# Patient Record
Sex: MALE | Race: WHITE
[De-identification: names, ages, dates, MRNs, and addresses within clinical notes are randomized per-mention and may not be internally consistent; named-entity substitution may affect disease eponyms.]

---

## 2019-03-09 ENCOUNTER — HOSPITAL ENCOUNTER (INPATIENT)
Dept: HOSPITAL 54 - ER | Age: 59
LOS: 4 days | Discharge: HOME HEALTH | DRG: 69 | End: 2019-03-13
Attending: FAMILY MEDICINE | Admitting: NURSE PRACTITIONER
Payer: COMMERCIAL

## 2019-03-09 VITALS — DIASTOLIC BLOOD PRESSURE: 71 MMHG | SYSTOLIC BLOOD PRESSURE: 118 MMHG

## 2019-03-09 VITALS — BODY MASS INDEX: 29.16 KG/M2 | HEIGHT: 73 IN | WEIGHT: 220 LBS

## 2019-03-09 DIAGNOSIS — I50.9: ICD-10-CM

## 2019-03-09 DIAGNOSIS — R29.700: ICD-10-CM

## 2019-03-09 DIAGNOSIS — N17.0: ICD-10-CM

## 2019-03-09 DIAGNOSIS — Z95.1: ICD-10-CM

## 2019-03-09 DIAGNOSIS — Z79.82: ICD-10-CM

## 2019-03-09 DIAGNOSIS — E78.5: ICD-10-CM

## 2019-03-09 DIAGNOSIS — E66.9: ICD-10-CM

## 2019-03-09 DIAGNOSIS — I25.10: ICD-10-CM

## 2019-03-09 DIAGNOSIS — G45.9: Primary | ICD-10-CM

## 2019-03-09 DIAGNOSIS — K02.9: ICD-10-CM

## 2019-03-09 DIAGNOSIS — I25.2: ICD-10-CM

## 2019-03-09 DIAGNOSIS — I11.0: ICD-10-CM

## 2019-03-09 LAB
ALBUMIN SERPL BCP-MCNC: 4.1 G/DL (ref 3.4–5)
ALP SERPL-CCNC: 99 U/L (ref 46–116)
ALT SERPL W P-5'-P-CCNC: 49 U/L (ref 12–78)
AST SERPL W P-5'-P-CCNC: 17 U/L (ref 15–37)
BASOPHILS # BLD AUTO: 0.1 /CMM (ref 0–0.2)
BASOPHILS NFR BLD AUTO: 0.8 % (ref 0–2)
BILIRUB SERPL-MCNC: 0.5 MG/DL (ref 0.2–1)
BUN SERPL-MCNC: 18 MG/DL (ref 7–18)
BUN SERPL-MCNC: 20 MG/DL (ref 7–18)
CALCIUM SERPL-MCNC: 9 MG/DL (ref 8.5–10.1)
CALCIUM SERPL-MCNC: 9.7 MG/DL (ref 8.5–10.1)
CHLORIDE SERPL-SCNC: 103 MMOL/L (ref 98–107)
CHLORIDE SERPL-SCNC: 103 MMOL/L (ref 98–107)
CHOLEST SERPL-MCNC: 137 MG/DL (ref ?–200)
CO2 SERPL-SCNC: 27 MMOL/L (ref 21–32)
CO2 SERPL-SCNC: 28 MMOL/L (ref 21–32)
CREAT SERPL-MCNC: 0.9 MG/DL (ref 0.6–1.3)
CREAT SERPL-MCNC: 1 MG/DL (ref 0.6–1.3)
EOSINOPHIL NFR BLD AUTO: 4.9 % (ref 0–6)
GLUCOSE SERPL-MCNC: 119 MG/DL (ref 74–106)
GLUCOSE SERPL-MCNC: 161 MG/DL (ref 74–106)
HCT VFR BLD AUTO: 46 % (ref 39–51)
HDLC SERPL-MCNC: 38 MG/DL (ref 40–60)
HGB BLD-MCNC: 15.6 G/DL (ref 13.5–17.5)
LDLC SERPL DIRECT ASSAY-MCNC: 82 MG/DL (ref 0–99)
LYMPHOCYTES NFR BLD AUTO: 17.8 % (ref 20–44)
LYMPHOCYTES NFR BLD AUTO: 2.2 /CMM (ref 0.8–4.8)
MCHC RBC AUTO-ENTMCNC: 34 G/DL (ref 31–36)
MCV RBC AUTO: 90 FL (ref 80–96)
MONOCYTES NFR BLD AUTO: 0.8 /CMM (ref 0.1–1.3)
MONOCYTES NFR BLD AUTO: 6.7 % (ref 2–12)
NEUTROPHILS # BLD AUTO: 8.6 /CMM (ref 1.8–8.9)
NEUTROPHILS NFR BLD AUTO: 69.8 % (ref 43–81)
NT-PROBNP SERPL-MCNC: 28 PG/ML (ref 0–125)
PLATELET # BLD AUTO: 323 /CMM (ref 150–450)
POTASSIUM SERPL-SCNC: 3.8 MMOL/L (ref 3.5–5.1)
POTASSIUM SERPL-SCNC: 4 MMOL/L (ref 3.5–5.1)
PROT SERPL-MCNC: 7.2 G/DL (ref 6.4–8.2)
RBC # BLD AUTO: 5.15 MIL/UL (ref 4.5–6)
SODIUM SERPL-SCNC: 137 MMOL/L (ref 136–145)
SODIUM SERPL-SCNC: 138 MMOL/L (ref 136–145)
TRIGL SERPL-MCNC: 107 MG/DL (ref 30–150)
TSH SERPL DL<=0.005 MIU/L-ACNC: 1.61 UIU/ML (ref 0.36–3.74)
WBC NRBC COR # BLD AUTO: 12.3 K/UL (ref 4.3–11)

## 2019-03-09 PROCEDURE — G0378 HOSPITAL OBSERVATION PER HR: HCPCS

## 2019-03-09 NOTE — NUR
RN NOTES



RECEIVED PATIENT'S REPORT FROM ROMEL LECHUGA RN [place]. [place] 
A/A/OX4. NO COMPLAINT OF SON, NO CHEST PAIN AT THIS TIME. PATIENT IS PLACED ON CARDIAC 
MONITOR WITH SR. PATIENT IS ON RA WITH SPO2 OF 98% WITH NON- LABORED BREATHING. SKIN 
ASSESSMENT HAS BEEN DONE AND PICTURES ARE TAKEN AND PLACED IN THE CHART. PATIENT IS 
AMBULATORY AND BRP. IV LINE ON RIGHT AC 18G IS PATIENT AND INTACT W/O IV FLUIDS. NIHSS 
ASSESSMENT HAS BEEN DONE. WILL CONTINUE TO MONITOR PATIENT CLOSELY.

## 2019-03-09 NOTE — NUR
PT BIB SELF C/O LEFT FACE AND ARM NUMBNESS, LAST WELL KNOWN 1630. PT "HAVE A 
WEIRD FEELING THEN SUDDEN NUMBNESS ON THE LEFT ARM AROUND 4PM" PT IS AAOX4, NOT 
IN RESPIRATORY DISTRESS, V/S STABLE, HOOKED TO MONITOR, KEPT RESTED AND 
COMFORTABLE, WILL CONTINUE TO MONITOR.

## 2019-03-10 VITALS — DIASTOLIC BLOOD PRESSURE: 64 MMHG | SYSTOLIC BLOOD PRESSURE: 98 MMHG

## 2019-03-10 VITALS — SYSTOLIC BLOOD PRESSURE: 123 MMHG | DIASTOLIC BLOOD PRESSURE: 71 MMHG

## 2019-03-10 VITALS — SYSTOLIC BLOOD PRESSURE: 110 MMHG | DIASTOLIC BLOOD PRESSURE: 61 MMHG

## 2019-03-10 VITALS — DIASTOLIC BLOOD PRESSURE: 79 MMHG | SYSTOLIC BLOOD PRESSURE: 116 MMHG

## 2019-03-10 VITALS — SYSTOLIC BLOOD PRESSURE: 113 MMHG | DIASTOLIC BLOOD PRESSURE: 77 MMHG

## 2019-03-10 VITALS — SYSTOLIC BLOOD PRESSURE: 116 MMHG | DIASTOLIC BLOOD PRESSURE: 71 MMHG

## 2019-03-10 LAB
APPEARANCE UR: CLEAR
BASOPHILS # BLD AUTO: 0.1 /CMM (ref 0–0.2)
BASOPHILS NFR BLD AUTO: 1 % (ref 0–2)
BILIRUB UR QL STRIP: NEGATIVE
BUN SERPL-MCNC: 18 MG/DL (ref 7–18)
CALCIUM SERPL-MCNC: 8.7 MG/DL (ref 8.5–10.1)
CHLORIDE SERPL-SCNC: 105 MMOL/L (ref 98–107)
CO2 SERPL-SCNC: 27 MMOL/L (ref 21–32)
COLOR UR: YELLOW
CREAT SERPL-MCNC: 0.9 MG/DL (ref 0.6–1.3)
EOSINOPHIL NFR BLD AUTO: 5.5 % (ref 0–6)
GLUCOSE SERPL-MCNC: 104 MG/DL (ref 74–106)
GLUCOSE UR STRIP-MCNC: NEGATIVE MG/DL
HCT VFR BLD AUTO: 43 % (ref 39–51)
HGB BLD-MCNC: 14.7 G/DL (ref 13.5–17.5)
HGB UR QL STRIP: (no result) ERY/UL
KETONES UR STRIP-MCNC: NEGATIVE MG/DL
LEUKOCYTE ESTERASE UR QL STRIP: NEGATIVE
LYMPHOCYTES NFR BLD AUTO: 2 /CMM (ref 0.8–4.8)
LYMPHOCYTES NFR BLD AUTO: 20.9 % (ref 20–44)
MAGNESIUM SERPL-MCNC: 1.9 MG/DL (ref 1.8–2.4)
MCHC RBC AUTO-ENTMCNC: 34 G/DL (ref 31–36)
MCV RBC AUTO: 90 FL (ref 80–96)
MONOCYTES NFR BLD AUTO: 0.8 /CMM (ref 0.1–1.3)
MONOCYTES NFR BLD AUTO: 7.9 % (ref 2–12)
NEUTROPHILS # BLD AUTO: 6.2 /CMM (ref 1.8–8.9)
NEUTROPHILS NFR BLD AUTO: 64.7 % (ref 43–81)
NITRITE UR QL STRIP: NEGATIVE
PH UR STRIP: 6.5 [PH] (ref 5–8)
PHOSPHATE SERPL-MCNC: 4.2 MG/DL (ref 2.5–4.9)
PLATELET # BLD AUTO: 262 /CMM (ref 150–450)
POTASSIUM SERPL-SCNC: 3.8 MMOL/L (ref 3.5–5.1)
PROT UR QL STRIP: NEGATIVE MG/DL
RBC # BLD AUTO: 4.79 MIL/UL (ref 4.5–6)
RBC #/AREA URNS HPF: (no result) /HPF (ref 0–2)
SODIUM SERPL-SCNC: 140 MMOL/L (ref 136–145)
UROBILINOGEN UR STRIP-MCNC: 0.2 EU/DL
WBC #/AREA URNS HPF: (no result) /HPF (ref 0–3)
WBC NRBC COR # BLD AUTO: 9.6 K/UL (ref 4.3–11)

## 2019-03-10 RX ADMIN — Medication SCH EACH: at 06:27

## 2019-03-10 RX ADMIN — Medication SCH EACH: at 18:05

## 2019-03-10 RX ADMIN — Medication SCH EACH: at 01:07

## 2019-03-10 RX ADMIN — ATORVASTATIN CALCIUM SCH MG: 10 TABLET, FILM COATED ORAL at 21:04

## 2019-03-10 RX ADMIN — CARVEDILOL SCH MG: 3.12 TABLET, FILM COATED ORAL at 08:30

## 2019-03-10 RX ADMIN — ASPIRIN 81 MG SCH MG: 81 TABLET ORAL at 08:30

## 2019-03-10 RX ADMIN — LISINOPRIL SCH MG: 5 TABLET ORAL at 21:05

## 2019-03-10 RX ADMIN — Medication SCH EACH: at 12:44

## 2019-03-10 NOTE — NUR
TELE RN OPENING NOTES

RECEIVED PT LYING ON BED.ALERT/ORIENTED X4.ON TELE HR IS 74 WITH SR.ON ROOM AIR,TOLERATING 
WELL.NO SOB AND ACUTE DISTRESS NOTED.IV LINE IS ON RIGHT AC G18,SITE IS CLEAN,DRY AND 
INTACT.NO INFILTRATION NOTED.SAFETY IS MAINTAINED AT ALL TIMES.BED IS IN LOW POSITION AND 
LOCKED.CALL LIGHT IS WITHIN REACH.WILL CONTINUE TO MONITOR THE PT CLOSELY AND WILL FOLLOW UP 
THE MRI PROCEDURE.

## 2019-03-10 NOTE — NUR
TELE RN NOTES

NP ZION SAAB SAID ITS OK TO HAVE MRI WITH SURGICAL CLIP ON THE CHEST AFTER VERIFIED WITH 
,CARDIOLOGIST.[facility] MADE AWARE AND AS PER HIM  ORDERED TO HOLD PT FOR 
A WHILE FOR MRI AND WILL LET THE STAFF KNOW WHEN THE PROCEDURE WILL TAKES PLACE.

## 2019-03-10 NOTE — NUR
TELE RN CLOSING NOTES

PT IS LYING ON BED.NO S/S STROKE NOTED.NO SIGNIFICANT CHANGES NOTED IN THE SHIFT.ENDORSED TO 
NIGHT SHIFT RN FOR ARLET AND FOLLOW UP WITH MRI IN AM.

## 2019-03-11 VITALS — DIASTOLIC BLOOD PRESSURE: 79 MMHG | SYSTOLIC BLOOD PRESSURE: 124 MMHG

## 2019-03-11 VITALS — SYSTOLIC BLOOD PRESSURE: 112 MMHG | DIASTOLIC BLOOD PRESSURE: 73 MMHG

## 2019-03-11 VITALS — DIASTOLIC BLOOD PRESSURE: 74 MMHG | SYSTOLIC BLOOD PRESSURE: 116 MMHG

## 2019-03-11 VITALS — SYSTOLIC BLOOD PRESSURE: 117 MMHG | DIASTOLIC BLOOD PRESSURE: 77 MMHG

## 2019-03-11 VITALS — SYSTOLIC BLOOD PRESSURE: 124 MMHG | DIASTOLIC BLOOD PRESSURE: 79 MMHG

## 2019-03-11 VITALS — SYSTOLIC BLOOD PRESSURE: 107 MMHG | DIASTOLIC BLOOD PRESSURE: 74 MMHG

## 2019-03-11 VITALS — DIASTOLIC BLOOD PRESSURE: 59 MMHG | SYSTOLIC BLOOD PRESSURE: 93 MMHG

## 2019-03-11 RX ADMIN — Medication SCH EACH: at 01:00

## 2019-03-11 RX ADMIN — Medication SCH EACH: at 11:45

## 2019-03-11 RX ADMIN — ALPRAZOLAM PRN MG: 1 TABLET ORAL at 21:24

## 2019-03-11 RX ADMIN — CARVEDILOL SCH MG: 3.12 TABLET, FILM COATED ORAL at 08:19

## 2019-03-11 RX ADMIN — Medication SCH EACH: at 06:02

## 2019-03-11 RX ADMIN — Medication SCH EACH: at 17:04

## 2019-03-11 RX ADMIN — ASPIRIN 81 MG SCH MG: 81 TABLET ORAL at 08:18

## 2019-03-11 RX ADMIN — LISINOPRIL SCH MG: 5 TABLET ORAL at 23:36

## 2019-03-11 RX ADMIN — ATORVASTATIN CALCIUM SCH MG: 10 TABLET, FILM COATED ORAL at 23:35

## 2019-03-11 NOTE — NUR
MS/RN   Patient received



Patient received from night shift. A/O X4, NSR on monitor, denies any pain or discomfort at 
this time. Safety measures in place, call light within reach, will continue to monitor and 
ensure safety.

## 2019-03-11 NOTE — NUR
MS/RN   Blood sugars



Patient refusing for blood sugars to be checked, stating "I'm not diabetic", MD aware.

## 2019-03-11 NOTE — NUR
TELE RN CLOSING NOTES,



PT IS LYING ON BED, SLEEPING AT THIS TIME BUT EASILY AROUSABLE TO VERBAL STIMULI, NO 
SIGNIFICANT CHANGE IN CONDITION THROUGHOUT THE NIGHT, WILL ENDORSE CONTINUITY OF CARE TO 
ONCOMING NURSE, HE WILL HAVE MRI THIS AM, PATIENT AWARE AND VERBALIZED UNDERSTANDING.

## 2019-03-11 NOTE — NUR
MS/RN   S/B Neurology



Seen by neuro NP, informed as to the status of MRI. Stated that she would follow up with Dr Gibson regarding any different testing that could be ordered instead of the MRI.

## 2019-03-11 NOTE — NUR
MS/RN   End note



Patient remains in stable condition, no new needs at this time. Continues to await for MRI, 
stating that if this is not done by tomorrow morning, patient wishes to be discharged to 
home aas nothing is being done here in the hospital.

Will endorse to night shift.

## 2019-03-11 NOTE — NUR
RN MS NOTES,



PATIENT LYING ON BED, AWAKE A/O X4,  BREATHING EVEN AND UNLABORED, NO SOB/ACUTE DISTRESS 
NOTED AT THIS TIME, DENIES PAIN OR DISCOMFORT,  RIGHT AC IV ACEES PATENT AND INTACT, ALL 
NEEDS PROVIDED, CALL LIGHT W/I REACH, WILL CONTINUE TO MONITOR CLOSELY.

## 2019-03-11 NOTE — NUR
MS/RN   MRI follow up



Call placed to MRI to follow up on schedule time, per technician, MRI on hold at this time 
until approval given by Dr Sims.

## 2019-03-12 VITALS — SYSTOLIC BLOOD PRESSURE: 107 MMHG | DIASTOLIC BLOOD PRESSURE: 66 MMHG

## 2019-03-12 VITALS — SYSTOLIC BLOOD PRESSURE: 116 MMHG | DIASTOLIC BLOOD PRESSURE: 74 MMHG

## 2019-03-12 VITALS — DIASTOLIC BLOOD PRESSURE: 74 MMHG | SYSTOLIC BLOOD PRESSURE: 116 MMHG

## 2019-03-12 VITALS — SYSTOLIC BLOOD PRESSURE: 120 MMHG | DIASTOLIC BLOOD PRESSURE: 77 MMHG

## 2019-03-12 VITALS — SYSTOLIC BLOOD PRESSURE: 94 MMHG | DIASTOLIC BLOOD PRESSURE: 62 MMHG

## 2019-03-12 RX ADMIN — CARVEDILOL SCH MG: 3.12 TABLET, FILM COATED ORAL at 08:12

## 2019-03-12 RX ADMIN — Medication SCH EACH: at 12:00

## 2019-03-12 RX ADMIN — Medication SCH EACH: at 00:26

## 2019-03-12 RX ADMIN — Medication SCH EACH: at 17:59

## 2019-03-12 RX ADMIN — ALPRAZOLAM PRN MG: 1 TABLET ORAL at 22:13

## 2019-03-12 RX ADMIN — LISINOPRIL SCH MG: 5 TABLET ORAL at 21:04

## 2019-03-12 RX ADMIN — Medication SCH EACH: at 06:24

## 2019-03-12 RX ADMIN — ATORVASTATIN CALCIUM SCH MG: 10 TABLET, FILM COATED ORAL at 21:03

## 2019-03-12 RX ADMIN — ASPIRIN 81 MG SCH MG: 81 TABLET ORAL at 08:11

## 2019-03-12 NOTE — NUR
TELE RN INITIAL NOTES,



PATIENT  IS  ON BED AWAKE, A/O X4, BREATHING EVEN AND UNLABORED, NO SOB/ACUTE DISTRESS NOTED 
AT THIS TIME, RIGHT AC IV ACCESS PATENT AND INTACT, ALL  NEEDS PROVIDED, CALL LIGHT W/I 
REACH, WILL CONTINUE TO MONITOR CLOSELY.

## 2019-03-12 NOTE — NUR
MS/RN OPENING NOTE



PATIENT IN BED IN STABLE CONDITION. A/O X 4. NO SIGNS OF ACUTE DISTRESS. NO COMPLAIN OF PAIN 
OR DISCOMFORT. ALL NEEDS ATTENDED TO. CALL LIGHT WITHIN REACH. WILL CONTINUE TO MONITOR TO 
ENSURE SAFETY.

## 2019-03-12 NOTE — NUR
MS/RN 



SPOKE WITH [employer] AND NOTIFIED PER MRI TECH IF OKAY TO DO BILATERAL EYE ORBIT XRAY 
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 PER [facility] SPANN OKAY FOR BILATERAL ORBIT 
XRAY. ORDERS NOTED AND CARRIED OUT. PATIENT NOTIFIED.

## 2019-03-12 NOTE — NUR
MS/RN 



SPOKE WITH AGNES FROM RADIOLOGY REGARDING PATIENT'S MRI STATUS AND MADE AWARE THE ORDER IS 
FROM 3/9/19 PER AGNES HE IS STILL AWAITING FOR DR SILVA'S APPROVAL. [facility] FOR NEURO 
DR. MURRAY NOTIFIED.

## 2019-03-12 NOTE — NUR
MS/RN CLOSING NOTES



PATIENT IN BED IN STABLE CONDITION. A/O X 4. NO SIGNS OF ACUTE DISTRESS. NO COMPLAIN OF PAIN 
OR DISCOMFORT. PENDING EEG RESULTS. ALL NEEDS ATTENDED TO. CALL LIGHT WITHIN REACH. WILL 
ENDORSE TO NEXT SHIFT FOR CONTINUITY OF CARE.

## 2019-03-13 VITALS — SYSTOLIC BLOOD PRESSURE: 104 MMHG | DIASTOLIC BLOOD PRESSURE: 90 MMHG

## 2019-03-13 VITALS — SYSTOLIC BLOOD PRESSURE: 119 MMHG | DIASTOLIC BLOOD PRESSURE: 67 MMHG

## 2019-03-13 LAB
BASOPHILS # BLD AUTO: 0.1 /CMM (ref 0–0.2)
BASOPHILS NFR BLD AUTO: 0.9 % (ref 0–2)
BUN SERPL-MCNC: 18 MG/DL (ref 7–18)
CALCIUM SERPL-MCNC: 8.6 MG/DL (ref 8.5–10.1)
CHLORIDE SERPL-SCNC: 104 MMOL/L (ref 98–107)
CO2 SERPL-SCNC: 26 MMOL/L (ref 21–32)
CREAT SERPL-MCNC: 0.9 MG/DL (ref 0.6–1.3)
EOSINOPHIL NFR BLD AUTO: 5.3 % (ref 0–6)
GLUCOSE SERPL-MCNC: 102 MG/DL (ref 74–106)
HCT VFR BLD AUTO: 45 % (ref 39–51)
HGB BLD-MCNC: 15.5 G/DL (ref 13.5–17.5)
LYMPHOCYTES NFR BLD AUTO: 1.8 /CMM (ref 0.8–4.8)
LYMPHOCYTES NFR BLD AUTO: 16.6 % (ref 20–44)
MCHC RBC AUTO-ENTMCNC: 34 G/DL (ref 31–36)
MCV RBC AUTO: 88 FL (ref 80–96)
MONOCYTES NFR BLD AUTO: 1 /CMM (ref 0.1–1.3)
MONOCYTES NFR BLD AUTO: 9.3 % (ref 2–12)
NEUTROPHILS # BLD AUTO: 7.4 /CMM (ref 1.8–8.9)
NEUTROPHILS NFR BLD AUTO: 67.9 % (ref 43–81)
PLATELET # BLD AUTO: 241 /CMM (ref 150–450)
POTASSIUM SERPL-SCNC: 4 MMOL/L (ref 3.5–5.1)
RBC # BLD AUTO: 5.11 MIL/UL (ref 4.5–6)
SODIUM SERPL-SCNC: 140 MMOL/L (ref 136–145)
WBC NRBC COR # BLD AUTO: 10.9 K/UL (ref 4.3–11)

## 2019-03-13 RX ADMIN — Medication SCH EACH: at 00:00

## 2019-03-13 RX ADMIN — CARVEDILOL SCH MG: 3.12 TABLET, FILM COATED ORAL at 08:39

## 2019-03-13 RX ADMIN — Medication SCH EACH: at 06:00

## 2019-03-13 RX ADMIN — ASPIRIN 81 MG SCH MG: 81 TABLET ORAL at 08:38

## 2019-03-13 NOTE — NUR
RN MS NOTES

PT IN BED, AWAKE, ALERT AND ORIENTED, NO COMPLAINT OF PAIN, RESPIRATIONS NORMAL, STROKE 
ASSESSMENT DONE, NO DEFICITS NOTED, PT AMBULATES WITH STEADY GAIT, NO COMPLAINT OF FACIAL OR 
ARM NUMBNESS, SEEN BY DR. LOBATO, DISCHARGE AND MEDICATION INSTRUCTIONS PROVIDED TO PT, 
VERBALIZED UNDERSTANDING, SKIN CHECK DONE, BELONGINGS ACCOUNTED FOR, PT EDUCATION REGARDING 
S/S OF STROKE AND STROKE PREVENTION PROVIDED TO PT AND FAMILY, VERBALIZED UNDERSTANDING, 
ASSISTED TO HOSPITAL LOBBY BY CNA VIA WHEELCHAIR, LEFT WITH FAMILY IN STABLE CONDITION.

## 2019-03-13 NOTE — NUR
RN CLOSING NOTES,



PATIENT SLEEPING AT THIS TIME, BREATHING EVEN AND UNLABORED, NO SOB/ACUTE DISTRESS NOTED AT 
THIS TIME, ALL NEEDS PROVIDED  REFUSED 0000 AND 0600 BLOOD SUGAR CHECK, STATED THAT HES NOT 
DIABETIC AND DOES NOT WANT BLOOD SUGAR CHECK, EXPLAINED RISKS AND BENEFITS 3X, STILL 
REFUSED, CALL LIGHT W/I REACH, WILL  ENDORSE CONTINUITY OF CARE TO ONCOMING NURSE.

## 2019-03-13 NOTE — NUR
RN MS NOTES

PT IN BED, AWAKE, ALERT AND ORIENTED, NO COMPLAINT OF PAIN OR ANY DISCOMFORT, RESPIRATIONS 
NORMAL, CALL LIGHT WITHIN REACH, STATES HE FEELS BETTER, NEEDS ATTENDED.

## 2021-03-04 NOTE — NUR
TELE RN CLOSING NOTES,



PT IS  ON BED AWAKE, A/O X4, BREATHING EVEN AND UNLABORED, NO SOB/ACUTE DISTRESS NOTES, ONE 
EPISODE OF ANXIETY DURING THE NIGHT AND XANAX ADMINISTERED AS ORDERED, ALL NEEDS PROVIDED  
STILL WAITING FOR MRI, BUT STATES HE WANTS TO GO HOME TODAY EVEN AND MRI IS NOT DONE, WILL  
ENDORSE CONTINUITY OF CARE TO ONCOMING NURSE. 71 year old Thai speaking Male with past medical history of Parkinson disease, HLD, HTN, H/O DVT on Eliquis and Lasix, H/O COVID presents to ED with altered mental status for 2 days.    Today:  Seen at bedside, not in any distress. Pt arousable but will not open eyes, slightly more aggressive today.        REVIEW OF SYSTEMS:  Not a reliable historian      MEDICATIONS  (STANDING):  apixaban 5 milliGRAM(s) Oral every 12 hours  atorvastatin 40 milliGRAM(s) Oral at bedtime  carbidopa/levodopa  25/100 2 Tablet(s) Oral <User Schedule>  chlorhexidine 4% Liquid 1 Application(s) Topical <User Schedule>  chlorhexidine 4% Liquid 1 Application(s) Topical <User Schedule>  colchicine 0.6 milliGRAM(s) Oral daily  dextrose 5% + sodium chloride 0.9%. 1000 milliLiter(s) (75 mL/Hr) IV Continuous <Continuous>  enalapril 5 milliGRAM(s) Oral daily  fluconAZOLE IVPB      fluconAZOLE IVPB 400 milliGRAM(s) IV Intermittent every 24 hours  pantoprazole   Suspension 40 milliGRAM(s) Oral before breakfast  piperacillin/tazobactam IVPB.. 2.25 Gram(s) IV Intermittent every 8 hours  polyethylene glycol 3350 17 Gram(s) Oral daily  senna 2 Tablet(s) Oral at bedtime    MEDICATIONS  (PRN):  acetaminophen  Suppository .. 650 milliGRAM(s) Rectal every 6 hours PRN Temp greater or equal to 38C (100.4F)      Allergies  No Known Allergies        FAMILY HISTORY:  Family history of cerebrovascular accident (CVA) (Father)        Vital Signs Last 24 Hrs  T(C): 38 (04 Mar 2021 05:41), Max: 38 (03 Mar 2021 21:00)  T(F): 100.4 (04 Mar 2021 05:41), Max: 100.4 (03 Mar 2021 21:00)  HR: 64 (04 Mar 2021 05:41) (64 - 70)  BP: 123/60 (04 Mar 2021 05:41) (123/58 - 184/74)  BP(mean): --  RR: 18 (04 Mar 2021 05:41) (18 - 18)  SpO2: --    PHYSICAL EXAM: cooling blanket in place  Constitutional: well-developed; well-groomed; well-nourished  Eyes :conjunctiva clear   Neck supple; no JVD  Back normal shape; ROM intact  Respiratory normal; airway patent; breath sounds equal; good air movement  Cardiovascular; regular rate and rhythm  no rub  no murmur  normal PMI  Gastrointestinal :soft, Nontender, No distension, NBS  Neurological: Alert, responds to pain; responds to verbal commands  Skin; warm and dry; color normal  Musculoskeletal: Left shoulder dislocation      LABS:                             10.9   5.90  )-----------( 368      ( 04 Mar 2021 09:10 )             34.6       03-04    140  |  105  |  13  ----------------------------<  92  4.1   |  27  |  1.2    Ca    8.9      04 Mar 2021 09:10    TPro  6.3  /  Alb  3.0<L>  /  TBili  0.2  /  DBili  x   /  AST  37  /  ALT  9   /  AlkPhos  136<H>  03-03      REPEAT Blood Cx and UCX pend      Culture Results:   Growth in aerobic bottle: Candida albicans  ***Blood Panel PCR results on this specimen are available  approximately 3 hours after the Gram stain result.***  Gram stain, PCR, and/or culture results may not always  correspond due to difference in methodologies.  ************************************************************  This PCR assay was performed by multiplex PCR. This  Assay tests for 66 bacterial and resistance gene targets.  Please refer to the [facility] test directory  at [email or web address] for details.  "Due to technical problems, Pseudomonas aeruginosa    until further notice". (02-27 @ 14:38)  Culture Results:   Growth in aerobic bottle: Candida albicans  See previous culture [id number] (02-27 @ 14:38)  Culture Results:   No Growth Final (02-13 @ 11:54)  Culture Results:   No growth at 48 hours (02-13 @ 09:36)  Culture Results:   <10,000 CFU/mL Normal Urogenital Chelle (02-12 @ 18:37)      RADIOLOGY:  < from: CT Abdomen and Pelvis w/ IV Cont (03.03.21 @ 17:41) >  IMPRESSION:    No acute abnormality in the abdomen and pelvis.    Mild splenomegaly measuring up to 14.2 cm in craniocaudal dimension, new since 9/25/2018. No focal splenic lesions.    See incidental findings above.              MOISES GUADALUPE MD; Attending Radiologist  This document has been electronically signed. Mar  4 2021  8:32AM    < end of copied text >    ECHO pend